# Patient Record
Sex: FEMALE | Race: WHITE | NOT HISPANIC OR LATINO | Employment: FULL TIME | URBAN - METROPOLITAN AREA
[De-identification: names, ages, dates, MRNs, and addresses within clinical notes are randomized per-mention and may not be internally consistent; named-entity substitution may affect disease eponyms.]

---

## 2022-03-15 ENCOUNTER — APPOINTMENT (OUTPATIENT)
Dept: GENERAL RADIOLOGY | Facility: CLINIC | Age: 22
DRG: 603 | End: 2022-03-15
Attending: EMERGENCY MEDICINE
Payer: OTHER MISCELLANEOUS

## 2022-03-15 ENCOUNTER — HOSPITAL ENCOUNTER (INPATIENT)
Facility: CLINIC | Age: 22
LOS: 2 days | Discharge: HOME | DRG: 603 | End: 2022-03-17
Attending: EMERGENCY MEDICINE | Admitting: HOSPITALIST
Payer: OTHER MISCELLANEOUS

## 2022-03-15 DIAGNOSIS — L03.119 CELLULITIS OF HAND: ICD-10-CM
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
## 2022-03-15 LAB
ANION GAP SERPL CALCULATED.3IONS-SCNC: 7 MMOL/L (ref 3–14)
BASOPHILS # BLD AUTO: 0.1 10E3/UL (ref 0–0.2)
BASOPHILS NFR BLD AUTO: 1 %
BUN SERPL-MCNC: 10 MG/DL (ref 7–30)
CALCIUM SERPL-MCNC: 9.5 MG/DL (ref 8.5–10.1)
CHLORIDE BLD-SCNC: 106 MMOL/L (ref 94–109)
CO2 SERPL-SCNC: 26 MMOL/L (ref 20–32)
CREAT SERPL-MCNC: 0.83 MG/DL (ref 0.52–1.04)
EOSINOPHIL # BLD AUTO: 0.2 10E3/UL (ref 0–0.7)
EOSINOPHIL NFR BLD AUTO: 3 %
ERYTHROCYTE [DISTWIDTH] IN BLOOD BY AUTOMATED COUNT: 11.9 % (ref 10–15)
FLUAV RNA SPEC QL NAA+PROBE: NEGATIVE
FLUBV RNA RESP QL NAA+PROBE: NEGATIVE
GFR SERPL CREATININE-BSD FRML MDRD: >90 ML/MIN/1.73M2
GLUCOSE BLD-MCNC: 74 MG/DL (ref 70–99)
HCG SER QL IA.RAPID: NEGATIVE
HCT VFR BLD AUTO: 42.2 % (ref 35–47)
HGB BLD-MCNC: 13.7 G/DL (ref 11.7–15.7)
IMM GRANULOCYTES # BLD: 0 10E3/UL
IMM GRANULOCYTES NFR BLD: 0 %
LYMPHOCYTES # BLD AUTO: 1.8 10E3/UL (ref 0.8–5.3)
LYMPHOCYTES NFR BLD AUTO: 27 %
MCH RBC QN AUTO: 30 PG (ref 26.5–33)
MCHC RBC AUTO-ENTMCNC: 32.5 G/DL (ref 31.5–36.5)
MCV RBC AUTO: 93 FL (ref 78–100)
MONOCYTES # BLD AUTO: 0.5 10E3/UL (ref 0–1.3)
MONOCYTES NFR BLD AUTO: 7 %
NEUTROPHILS # BLD AUTO: 4.2 10E3/UL (ref 1.6–8.3)
NEUTROPHILS NFR BLD AUTO: 62 %
NRBC # BLD AUTO: 0 10E3/UL
NRBC BLD AUTO-RTO: 0 /100
PLATELET # BLD AUTO: 249 10E3/UL (ref 150–450)
POTASSIUM BLD-SCNC: 3.7 MMOL/L (ref 3.4–5.3)
RBC # BLD AUTO: 4.56 10E6/UL (ref 3.8–5.2)
SARS-COV-2 RNA RESP QL NAA+PROBE: NEGATIVE
SODIUM SERPL-SCNC: 139 MMOL/L (ref 133–144)
WBC # BLD AUTO: 6.8 10E3/UL (ref 4–11)

## 2022-03-15 PROCEDURE — 99207 PR CDG-CODE CATEGORY CHANGED: CPT | Performed by: NURSE PRACTITIONER

## 2022-03-15 PROCEDURE — 73130 X-RAY EXAM OF HAND: CPT | Mod: RT

## 2022-03-15 PROCEDURE — 99285 EMERGENCY DEPT VISIT HI MDM: CPT

## 2022-03-15 PROCEDURE — 250N000013 HC RX MED GY IP 250 OP 250 PS 637: Performed by: NURSE PRACTITIONER

## 2022-03-15 PROCEDURE — 80048 BASIC METABOLIC PNL TOTAL CA: CPT | Performed by: EMERGENCY MEDICINE

## 2022-03-15 PROCEDURE — C9803 HOPD COVID-19 SPEC COLLECT: HCPCS

## 2022-03-15 PROCEDURE — 96365 THER/PROPH/DIAG IV INF INIT: CPT

## 2022-03-15 PROCEDURE — 87070 CULTURE OTHR SPECIMN AEROBIC: CPT | Performed by: EMERGENCY MEDICINE

## 2022-03-15 PROCEDURE — 258N000003 HC RX IP 258 OP 636: Performed by: NURSE PRACTITIONER

## 2022-03-15 PROCEDURE — 84702 CHORIONIC GONADOTROPIN TEST: CPT

## 2022-03-15 PROCEDURE — 250N000011 HC RX IP 250 OP 636: Performed by: EMERGENCY MEDICINE

## 2022-03-15 PROCEDURE — 85025 COMPLETE CBC W/AUTO DIFF WBC: CPT | Performed by: EMERGENCY MEDICINE

## 2022-03-15 PROCEDURE — 87636 SARSCOV2 & INF A&B AMP PRB: CPT | Performed by: EMERGENCY MEDICINE

## 2022-03-15 PROCEDURE — 99223 1ST HOSP IP/OBS HIGH 75: CPT | Mod: AI | Performed by: NURSE PRACTITIONER

## 2022-03-15 PROCEDURE — 96367 TX/PROPH/DG ADDL SEQ IV INF: CPT

## 2022-03-15 PROCEDURE — 36415 COLL VENOUS BLD VENIPUNCTURE: CPT | Performed by: EMERGENCY MEDICINE

## 2022-03-15 PROCEDURE — 120N000004 HC R&B MS OVERFLOW

## 2022-03-15 RX ORDER — PROCHLORPERAZINE 25 MG
25 SUPPOSITORY, RECTAL RECTAL EVERY 12 HOURS PRN
Status: DISCONTINUED | OUTPATIENT
Start: 2022-03-15 | End: 2022-03-17 | Stop reason: HOSPADM

## 2022-03-15 RX ORDER — METRONIDAZOLE 500 MG/100ML
500 INJECTION, SOLUTION INTRAVENOUS EVERY 8 HOURS
Status: DISCONTINUED | OUTPATIENT
Start: 2022-03-16 | End: 2022-03-17

## 2022-03-15 RX ORDER — POLYETHYLENE GLYCOL 3350 17 G/17G
17 POWDER, FOR SOLUTION ORAL DAILY PRN
Status: DISCONTINUED | OUTPATIENT
Start: 2022-03-15 | End: 2022-03-17 | Stop reason: HOSPADM

## 2022-03-15 RX ORDER — LEVONORGESTREL 19.5 MG/1
1 INTRAUTERINE DEVICE INTRAUTERINE ONCE
COMMUNITY

## 2022-03-15 RX ORDER — HYDROXYZINE HYDROCHLORIDE 25 MG/1
25 TABLET, FILM COATED ORAL DAILY PRN
COMMUNITY

## 2022-03-15 RX ORDER — CEFTRIAXONE 1 G/1
1 INJECTION, POWDER, FOR SOLUTION INTRAMUSCULAR; INTRAVENOUS EVERY 24 HOURS
Status: DISCONTINUED | OUTPATIENT
Start: 2022-03-16 | End: 2022-03-17

## 2022-03-15 RX ORDER — ACETAMINOPHEN 325 MG/1
650 TABLET ORAL EVERY 6 HOURS PRN
Status: DISCONTINUED | OUTPATIENT
Start: 2022-03-15 | End: 2022-03-17 | Stop reason: HOSPADM

## 2022-03-15 RX ORDER — OXYCODONE HYDROCHLORIDE 5 MG/1
5 TABLET ORAL EVERY 4 HOURS PRN
Status: DISCONTINUED | OUTPATIENT
Start: 2022-03-15 | End: 2022-03-17 | Stop reason: HOSPADM

## 2022-03-15 RX ORDER — BUSPIRONE HYDROCHLORIDE 10 MG/1
10 TABLET ORAL EVERY MORNING
COMMUNITY

## 2022-03-15 RX ORDER — ONDANSETRON 2 MG/ML
4 INJECTION INTRAMUSCULAR; INTRAVENOUS EVERY 6 HOURS PRN
Status: DISCONTINUED | OUTPATIENT
Start: 2022-03-15 | End: 2022-03-17 | Stop reason: HOSPADM

## 2022-03-15 RX ORDER — SODIUM CHLORIDE 9 MG/ML
INJECTION, SOLUTION INTRAVENOUS CONTINUOUS
Status: DISCONTINUED | OUTPATIENT
Start: 2022-03-15 | End: 2022-03-16 | Stop reason: CLARIF

## 2022-03-15 RX ORDER — NALOXONE HYDROCHLORIDE 0.4 MG/ML
0.2 INJECTION, SOLUTION INTRAMUSCULAR; INTRAVENOUS; SUBCUTANEOUS
Status: DISCONTINUED | OUTPATIENT
Start: 2022-03-15 | End: 2022-03-17 | Stop reason: HOSPADM

## 2022-03-15 RX ORDER — ACETAMINOPHEN 650 MG/1
650 SUPPOSITORY RECTAL EVERY 6 HOURS PRN
Status: DISCONTINUED | OUTPATIENT
Start: 2022-03-15 | End: 2022-03-17 | Stop reason: HOSPADM

## 2022-03-15 RX ORDER — AMOXICILLIN 250 MG
2 CAPSULE ORAL 2 TIMES DAILY
Status: DISCONTINUED | OUTPATIENT
Start: 2022-03-15 | End: 2022-03-17 | Stop reason: HOSPADM

## 2022-03-15 RX ORDER — CEFTRIAXONE 1 G/1
1 INJECTION, POWDER, FOR SOLUTION INTRAMUSCULAR; INTRAVENOUS ONCE
Status: COMPLETED | OUTPATIENT
Start: 2022-03-15 | End: 2022-03-15

## 2022-03-15 RX ORDER — METRONIDAZOLE 500 MG/100ML
500 INJECTION, SOLUTION INTRAVENOUS ONCE
Status: COMPLETED | OUTPATIENT
Start: 2022-03-15 | End: 2022-03-15

## 2022-03-15 RX ORDER — ONDANSETRON 4 MG/1
4 TABLET, ORALLY DISINTEGRATING ORAL EVERY 6 HOURS PRN
Status: DISCONTINUED | OUTPATIENT
Start: 2022-03-15 | End: 2022-03-17 | Stop reason: HOSPADM

## 2022-03-15 RX ORDER — HYDROMORPHONE HCL IN WATER/PF 6 MG/30 ML
0.2 PATIENT CONTROLLED ANALGESIA SYRINGE INTRAVENOUS
Status: DISCONTINUED | OUTPATIENT
Start: 2022-03-15 | End: 2022-03-17 | Stop reason: HOSPADM

## 2022-03-15 RX ORDER — NALOXONE HYDROCHLORIDE 0.4 MG/ML
0.4 INJECTION, SOLUTION INTRAMUSCULAR; INTRAVENOUS; SUBCUTANEOUS
Status: DISCONTINUED | OUTPATIENT
Start: 2022-03-15 | End: 2022-03-17 | Stop reason: HOSPADM

## 2022-03-15 RX ORDER — PROCHLORPERAZINE MALEATE 5 MG
10 TABLET ORAL EVERY 6 HOURS PRN
Status: DISCONTINUED | OUTPATIENT
Start: 2022-03-15 | End: 2022-03-17 | Stop reason: HOSPADM

## 2022-03-15 RX ORDER — HYDROXYZINE HYDROCHLORIDE 25 MG/1
25 TABLET, FILM COATED ORAL DAILY PRN
Status: DISCONTINUED | OUTPATIENT
Start: 2022-03-15 | End: 2022-03-17 | Stop reason: HOSPADM

## 2022-03-15 RX ORDER — CITALOPRAM HYDROBROMIDE 40 MG/1
40 TABLET ORAL EVERY MORNING
COMMUNITY

## 2022-03-15 RX ORDER — CITALOPRAM HYDROBROMIDE 20 MG/1
40 TABLET ORAL EVERY MORNING
Status: DISCONTINUED | OUTPATIENT
Start: 2022-03-16 | End: 2022-03-17 | Stop reason: HOSPADM

## 2022-03-15 RX ORDER — BUSPIRONE HYDROCHLORIDE 5 MG/1
10 TABLET ORAL EVERY MORNING
Status: DISCONTINUED | OUTPATIENT
Start: 2022-03-16 | End: 2022-03-17 | Stop reason: HOSPADM

## 2022-03-15 RX ORDER — AMOXICILLIN 250 MG
1 CAPSULE ORAL 2 TIMES DAILY
Status: DISCONTINUED | OUTPATIENT
Start: 2022-03-15 | End: 2022-03-17 | Stop reason: HOSPADM

## 2022-03-15 RX ADMIN — ACETAMINOPHEN 650 MG: 325 TABLET, FILM COATED ORAL at 21:36

## 2022-03-15 RX ADMIN — METRONIDAZOLE 500 MG: 500 INJECTION, SOLUTION INTRAVENOUS at 19:14

## 2022-03-15 RX ADMIN — CEFTRIAXONE 1 G: 1 INJECTION, POWDER, FOR SOLUTION INTRAMUSCULAR; INTRAVENOUS at 18:46

## 2022-03-15 RX ADMIN — SODIUM CHLORIDE: 9 INJECTION, SOLUTION INTRAVENOUS at 21:22

## 2022-03-15 ASSESSMENT — ACTIVITIES OF DAILY LIVING (ADL)
ADLS_ACUITY_SCORE: 4
ADLS_ACUITY_SCORE: 12
ADLS_ACUITY_SCORE: 4
ADLS_ACUITY_SCORE: 12

## 2022-03-15 NOTE — ED TRIAGE NOTES
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  Seen at . Wound was cleaned at that time. Using triple abx ointment, and augmentin. Today hand has been more swollen. The dog that bit her had a \"necrotic mouth\" that owners chose not to do cultures of.   "

## 2022-03-15 NOTE — LETTER
[facility] OBSERVATION DEPT  [address]  [facility] [address]  [phone] March 17, 2022    RE:  Porsche Moody                                                                                                                                                       [address]   [facility] [address]            To whom it may concern:    Porsche Moody was hospitalized from 3/15/2022 to 3/17/2022. She will discharge to home today and will need continued follow-up as an outpatient. Pending any changes by her primary care provider, she is able to return to work without restrictions on Wednesday, March 23, 2022.      Sincerely,          Darien Chan MD

## 2022-03-15 NOTE — ED NOTES
[facility]  ED Nurse Handoff Report    Porsche Moody is a 22 year old female   ED Chief complaint: Dog Bite  . ED Diagnosis:   Final diagnoses:   None     Allergies:   Allergies   Allergen Reactions     Amoxicillin      Sulfa Drugs        Code Status: Full Code  Activity level - Baseline/Home:  Independent. Activity Level - Current:   Independent. Lift room needed: No. Bariatric: No   Needed: No   Isolation: No. Infection: Not Applicable.     Vital Signs:   Vitals:    03/15/22 1737 03/15/22 1739 03/15/22 1800 03/15/22 1830   BP: 131/77  113/71    Pulse:  94 72    Resp:  18     Temp:  98.5  F (36.9  C)     TempSrc:  Temporal     SpO2:  100% 98% 97%       Cardiac Rhythm:  ,      Pain level:    Patient confused: No. Patient Falls Risk: No.   Elimination Status: Has voided   Patient Report - Initial Complaint: Dog bite. Focused Assessment: Dog bite to R hand on Sunday. R hand increasingly edematous, red and painful today. Able to move and feel fingers. CMS/pulses WNL. Puncture wound to top of hand, smaller wound further up wrist.    Tests Performed: Labs, imaging.   Abnormal Results: Labs Ordered and Resulted from Time of ED Arrival to Time of ED Departure - No data to display  XR Hand Right G/E 3 Views    (Results Pending)      Treatments provided: IV antibiotics   Family Comments: Sister present in ED  OBS brochure/video discussed/provided to patient:  N/A  ED Medications:   Medications   cefTRIAXone (ROCEPHIN) 1 g vial to attach to  mL bag for ADULTS or NS 50 mL bag for PEDS (has no administration in time range)   metroNIDAZOLE (FLAGYL) infusion 500 mg (has no administration in time range)     Drips infusing:  No  For the majority of the shift, the patient's behavior Green. Interventions performed were N/A.    Sepsis treatment initiated: No     Patient tested for COVID 19 prior to admission: YES    ED Nurse Name/Phone Number: Rena Vasquez RN,   6:35 PM    RECEIVING UNIT ED  HANDOFF REVIEW    Above ED Nurse Handoff Report was reviewed: Yes  Reviewed by: Lizeth Serrano RN on March 15, 2022 at 8:28 PM

## 2022-03-15 NOTE — ED PROVIDER NOTES
History     Chief Complaint:  Dog Bite       HPI:  Porsche Moody is a 22 year old female who presents with dog bite.  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  2 days ago, she was assisting staff when they brought in a dog that was minimally responsive.  While the patient was shaving the  dog's leg, the dog subsequently bit the patient's right hand.  She was initially seen and evaluated and the wound was cleansed.  She was noted on Augmentin.  She notes the dog is up-to-date on vaccinations including rabies.  She has been continuing with ibuprofen, though presents to the ED today given increased pain, swelling, and erythema.  She was initially seen in urgent care, though referred on to the ED for further evaluation.  Per chart review, last tetanus updated in January of this year.    Allergies:  Amoxicillin  Sulfa Drugs     Medications:    norethindrone-ethinyl estradiol-iron (ESTROSTEP FE) 1-20/1-30/1-35 MG-MCG TABS    Augmentin    Past Medical History:    Otherwise healthy    Past Surgical History:    No past surgical history on file.     Family History:    family history is not on file.    Social History:  Works at vet clinic  UTD on tetanus   Review of Systems:  10 point ROS is negative aside from that mentioned in the HPI      Physical Exam     Patient Vitals for the past 24 hrs:   BP Temp Temp src Pulse Resp SpO2   03/15/22 2000 104/72 -- -- 63 -- 97 %   03/15/22 1930 126/69 -- -- 66 -- 97 %   03/15/22 1900 116/82 -- -- 82 -- 100 %   03/15/22 1830 133/72 -- -- 71 -- 97 %   03/15/22 1800 113/71 -- -- 72 -- 98 %   03/15/22 1739 -- 98.5  F (36.9  C) Temporal 94 18 100 %   03/15/22 1737 131/77 -- -- -- -- --      General:   Well-nourished   Speaking in full sentences  Eyes:   Conjunctiva without injection or scleral icterus  Resp:   Even, non-labored respirationss  CV:    RRR  Skin:   Warm, dry, well perfused   Puncture wounds as noted below   Swelling, warmth and tenderness primarily over the  dorsal aspect of the hand   No significant drainage from wound   No swelling or focal tenderness about wrist  MSK:   Moves all extremities   No focal deformities or swelling   AROM to flexion/extension at the wrist  Neuro:   Alert   Answers questions appropriately   Moves all extremities equally   Gait stable  Psych:   Normal affect, normal mood          Emergency Department Course           Imaging:  Radiology findings were communicated with the patient who voiced understanding of the findings.  XR Hand Right G/E 3 Views   Final Result   IMPRESSION: Soft tissue swelling dorsal to the metacarpal region. No subcutaneous emphysema. No cortical erosion to suggest osteomyelitis. No fractures are evident. Normal alignment.           Laboratory:  Laboratory findings were communicated with the patient who voiced understanding of the findings.  Labs Ordered and Resulted from Time of ED Arrival to Time of ED Departure   BASIC METABOLIC PANEL - Normal       Result Value    Sodium 139      Potassium 3.7      Chloride 106      Carbon Dioxide (CO2) 26      Anion Gap 7      Urea Nitrogen 10      Creatinine 0.83      Calcium 9.5      Glucose 74      GFR Estimate >90     INFLUENZA A/B & SARS-COV2 PCR MULTIPLEX - Normal    Influenza A PCR Negative      Influenza B PCR Negative      SARS CoV2 PCR Negative     ISTAT HCG QUALITATIVE PREGNANCY POCT - Normal    HCG Qualitative POCT Negative     CBC WITH PLATELETS AND DIFFERENTIAL    WBC Count 6.8      RBC Count 4.56      Hemoglobin 13.7      Hematocrit 42.2      MCV 93      MCH 30.0      MCHC 32.5      RDW 11.9      Platelet Count 249      % Neutrophils 62      % Lymphocytes 27      % Monocytes 7      % Eosinophils 3      % Basophils 1      % Immature Granulocytes 0      NRBCs per 100 WBC 0      Absolute Neutrophils 4.2      Absolute Lymphocytes 1.8      Absolute Monocytes 0.5      Absolute Eosinophils 0.2      Absolute Basophils 0.1      Absolute Immature Granulocytes 0.0      Absolute  NRBCs 0.0     AEROBIC BACTERIAL CULTURE ROUTINE      Interventions:  Medications   cefTRIAXone (ROCEPHIN) 1 g vial to attach to  mL bag for ADULTS or NS 50 mL bag for PEDS (0 g Intravenous Stopped 3/15/22 1909)   metroNIDAZOLE (FLAGYL) infusion 500 mg (0 mg Intravenous Stopped 3/15/22 2013)        Emergency Department Course / Reassessment:  Nursing notes and vitals reviewed.  6:00 PM: I performed an exam of the patient as documented above.      The patient was sent for X-ray while in the emergency department, results above.      ED Course as of 03/15/22 2029   Tue Mar 15, 2022   1925 Patient updated   1927 Spoke with Dr. Uribe from Orthopedics   2000 Patient re-checked.  No wrist pain.  Full ROM to flexion/extension at wrist without pain.        I discussed the treatment plan with the patient and sister.  They are in agreement with hospitalization at this time.  Case was discussed with [facility],, who has graciously accepted the patient for admission under care of Dr. Chan.  Questions were answered prior to transfer of care.      Impression & Plan      Medical Decision Making:  Porsche Moody is a 22 year old female who presents to the ER for evaluation of a dog bite.  Vital signs on presentation reveal mildly elevated BP, though are otherwise unremarkable.  Exam as outlined above, concerning for evolving cellulitis of the right hand following puncture wound, despite oral antibiotics.  No significant expression of fluid is noted on exam, though swab obtained to send for culture.  Labs obtained revealing normal WBC count, and negative hCG.  X-ray of the hand reveals no evidence of soft tissue gas.  The erythema, swelling, and tenderness appears over the dorsum of the hand, and distinctly separate from the wrist.  She demonstrates adequate range of motion about the wrist, and denies pain through full ROM about the wrist, for which I feel septic arthritis to be unlikely.  She was provided Rocephin and  metronidazole.  She will require hospitalization given progression of symptoms and failure of Augmentin.  Tetanus status is up-to-date.  She also affirmed the rabies vaccination series is up-to-date on the culprit animal.  Case discussed with Dr. Uribe from [facility] orthopedics  hand surgery, who was in agreement with admission and will consult on the patient.  Patient and sister updated on outlined plan of care.  Questions answered prior to admission.    Diagnosis:    ICD-10-CM    1. Dog bite, initial encounter  W54.0XXA    2. Cellulitis of hand  L03.119         3/15/2022   Klaus Herrera MD Roach, Brian Donald, MD  03/15/22 2030

## 2022-03-16 LAB
ANION GAP SERPL CALCULATED.3IONS-SCNC: 5 MMOL/L (ref 3–14)
BUN SERPL-MCNC: 9 MG/DL (ref 7–30)
CALCIUM SERPL-MCNC: 9.2 MG/DL (ref 8.5–10.1)
CHLORIDE BLD-SCNC: 109 MMOL/L (ref 94–109)
CO2 SERPL-SCNC: 26 MMOL/L (ref 20–32)
CREAT SERPL-MCNC: 0.75 MG/DL (ref 0.52–1.04)
ERYTHROCYTE [DISTWIDTH] IN BLOOD BY AUTOMATED COUNT: 11.9 % (ref 10–15)
GFR SERPL CREATININE-BSD FRML MDRD: >90 ML/MIN/1.73M2
GLUCOSE BLD-MCNC: 90 MG/DL (ref 70–99)
HCT VFR BLD AUTO: 42.1 % (ref 35–47)
HGB BLD-MCNC: 13.3 G/DL (ref 11.7–15.7)
MCH RBC QN AUTO: 29.6 PG (ref 26.5–33)
MCHC RBC AUTO-ENTMCNC: 31.6 G/DL (ref 31.5–36.5)
MCV RBC AUTO: 94 FL (ref 78–100)
PLATELET # BLD AUTO: 221 10E3/UL (ref 150–450)
POTASSIUM BLD-SCNC: 4.1 MMOL/L (ref 3.4–5.3)
RBC # BLD AUTO: 4.49 10E6/UL (ref 3.8–5.2)
SODIUM SERPL-SCNC: 140 MMOL/L (ref 133–144)
WBC # BLD AUTO: 5.9 10E3/UL (ref 4–11)

## 2022-03-16 PROCEDURE — 90471 IMMUNIZATION ADMIN: CPT | Performed by: PHYSICIAN ASSISTANT

## 2022-03-16 PROCEDURE — 250N000011 HC RX IP 250 OP 636: Performed by: PHYSICIAN ASSISTANT

## 2022-03-16 PROCEDURE — 90675 RABIES VACCINE IM: CPT | Performed by: PHYSICIAN ASSISTANT

## 2022-03-16 PROCEDURE — 80048 BASIC METABOLIC PNL TOTAL CA: CPT | Performed by: NURSE PRACTITIONER

## 2022-03-16 PROCEDURE — 120N000004 HC R&B MS OVERFLOW

## 2022-03-16 PROCEDURE — 90375 RABIES IG IM/SC: CPT | Performed by: PHYSICIAN ASSISTANT

## 2022-03-16 PROCEDURE — 99233 SBSQ HOSP IP/OBS HIGH 50: CPT | Performed by: HOSPITALIST

## 2022-03-16 PROCEDURE — 36415 COLL VENOUS BLD VENIPUNCTURE: CPT | Performed by: NURSE PRACTITIONER

## 2022-03-16 PROCEDURE — 250N000013 HC RX MED GY IP 250 OP 250 PS 637: Performed by: NURSE PRACTITIONER

## 2022-03-16 PROCEDURE — 85027 COMPLETE CBC AUTOMATED: CPT | Performed by: NURSE PRACTITIONER

## 2022-03-16 PROCEDURE — 258N000003 HC RX IP 258 OP 636: Performed by: NURSE PRACTITIONER

## 2022-03-16 PROCEDURE — 250N000011 HC RX IP 250 OP 636: Performed by: NURSE PRACTITIONER

## 2022-03-16 RX ADMIN — RABIES VIRUS STRAIN PM-1503-3M ANTIGEN (PROPIOLACTONE INACTIVATED) AND WATER 1 ML: KIT at 12:40

## 2022-03-16 RX ADMIN — CITALOPRAM HYDROBROMIDE 40 MG: 20 TABLET ORAL at 08:45

## 2022-03-16 RX ADMIN — METRONIDAZOLE 500 MG: 500 INJECTION, SOLUTION INTRAVENOUS at 18:48

## 2022-03-16 RX ADMIN — METRONIDAZOLE 500 MG: 500 INJECTION, SOLUTION INTRAVENOUS at 10:16

## 2022-03-16 RX ADMIN — RABIES IMMUNE GLOBULIN (HUMAN) 1530 UNITS: 300 INJECTION, SOLUTION INFILTRATION; INTRAMUSCULAR at 12:45

## 2022-03-16 RX ADMIN — ACETAMINOPHEN 650 MG: 325 TABLET, FILM COATED ORAL at 20:36

## 2022-03-16 RX ADMIN — BUSPIRONE HYDROCHLORIDE 10 MG: 5 TABLET ORAL at 08:45

## 2022-03-16 RX ADMIN — ACETAMINOPHEN 650 MG: 325 TABLET, FILM COATED ORAL at 13:06

## 2022-03-16 RX ADMIN — CEFTRIAXONE 1 G: 1 INJECTION, POWDER, FOR SOLUTION INTRAMUSCULAR; INTRAVENOUS at 18:30

## 2022-03-16 RX ADMIN — SODIUM CHLORIDE: 9 INJECTION, SOLUTION INTRAVENOUS at 08:16

## 2022-03-16 RX ADMIN — METRONIDAZOLE 500 MG: 500 INJECTION, SOLUTION INTRAVENOUS at 02:40

## 2022-03-16 ASSESSMENT — ACTIVITIES OF DAILY LIVING (ADL)
ADLS_ACUITY_SCORE: 4

## 2022-03-16 NOTE — CONSULTS
Care Management     Length of Stay (days): 1    Expected Discharge Date: 03/17/2022     Additional Information:  CM consulted to assist with arranging Rabies shots.   Spoke with bedside nurse who said she had already arranged the rabies shots and there are no additional needs.    Tyra Fragoso RN, BSN, PHN, [facility]  Care Coordinator  [facility]  [phone]

## 2022-03-16 NOTE — PLAN OF CARE
ROOM # 206-2    Living Situation (if not independent, order SW consult): apartment-with boyfriend.  Facility name:  : sister- [phone]    Activity level at baseline: independent  Activity level on admit: independent    Who will be transporting you at discharge - sister    Patient registered to observation; given Patient Bill of Rights; given the opportunity to ask questions about observation status and their plan of care.  Patient has been oriented to the observation room, bathroom and call light is in place.    Discussed discharge goals and expectations with patient/family.

## 2022-03-16 NOTE — PROGRESS NOTES
"Vitals: Blood pressure 121/74, pulse 74, temperature 97.7  F (36.5  C), temperature source Oral, resp. rate 18, height 1.702 m (5' 7\"), weight 76.1 kg (167 lb 11.2 oz), SpO2 99 %.  Labs: drawn labs today have been normal.   Cardiac: WDL   Resp: WDL  Neuro: Patient is alert and oriented x 4  GI: WDL  : WDL  Skin: Patient has cellulitis on right hand/wrist from dog bite. Swelling, redness, and numbness has improved per patient.   Activity: patient is independent in room.  Diet: regular diet  Pain: patient only had complaints of pain after receiving rabies vaccination. Was given tylenol to treat the pain, pain has improved since intervention.   Plan: Patient will receive more flagyl and rocephin IV antibiotics. Plan is to discharge tomorrow pending results from IV antibiotics in treating cellulitis.     "

## 2022-03-16 NOTE — ED NOTES
Reviewed case with [place] [facility].  They did recommend initiation of rabies post-exposure prophylaxis given illness of the dog and inability to test.  Hospitalist team (BARTOLOME Aguilar) contacted by phone and will initiate rabies PEP and inform patient.     Klaus Herrera MD  03/16/22 2957

## 2022-03-16 NOTE — PROGRESS NOTES
[facility]    Medicine Progress Note - Hospitalist Service    Date of Admission:  3/15/2022    Assessment & Plan         Porsche Moody is a 22 year old healthy female who is right-hand dominant was admitted on 3/15/2022 to a medical bed for further evaluation and treatment of cellulitis of her right hand related to sustaining a dog bite on 3/13/2022.    # Acute cellulitis to R hand.  No clinical or radiographic evidence of osteomyelitis.  Does not appear to be a septic arthritis nor necrotizing soft tissue infection.  # Dog bite.  Failed outpatient management  -Orthopedic surgery consulted, recommending medical management  -Continue regular diet with no plans for surgical intervention at this time  -Pain management  -Elevate right upper extremity  -Continue IV Rocephin and Flagyl, hold Augmentin but will likely be able to transition to this at discharge  -CMS checks every 4 hours  -Contacted by ED provider Dr. Herrera who saw patient on 3/15 and after discussion with [facility] it was recommended the patient be treated for rabies postexposure prophylaxis, contacted ED provider Dr. Jarrell who was able to assist with administering rabies immunoglobulin and vaccine  -will need to complete rabies postexposure prophylaxis on day 3, 7, and 14, f/u set up on 3/19 for rabies vaccine at 12 on pediatrics unit (pediatrics will arrange future appointments)     #Anxiety: Continue PTA Celexa, Atarax, and Buspar     Diet: Regular Diet Adult    DVT Prophylaxis: Low Risk/Ambulatory with no VTE prophylaxis indicated  Bailey Catheter: Not present  Central Lines: None  Cardiac Monitoring: None  Code Status: Full Code      Disposition Plan   Expected Discharge: 03/17/2022     Anticipated discharge location:  Awaiting care coordination huddle  Delays:          The patient's care was discussed with the Attending Physician, Dr. Chan, Bedside Nurse, Patient, Patient's Family and ED team.    Crystal Ortiz,  "BRUCE  Hospitalist Service  [facility]  Securely message with the [employer] Web Console (learn more here)  Text page via [employer] Paging/Directory         Clinically Significant Risk Factors Present on Admission                 # Overweight: Estimated body mass index is 26.27 kg/m  as calculated from the following:    Height as of this encounter: 1.702 m (5' 7\").    Weight as of this encounter: 76.1 kg (167 lb 11.2 oz).      ______________________________________________________________________    Interval History   Patient reports purulent discharge prior to admission but none since.  She states the swelling is the same as when she was admitted in the dorsum of her hand but improving in her fingers.  She states erythema is improving.  She is able to make A fist.  Denies any fevers or chills prior to admission.    Discussed plan of care with sister and father at bedside during interview with all questions answered.     Data reviewed today: I reviewed all medications, new labs and imaging results over the last 24 hours.     Physical Exam   Vital Signs: Temp: 97.7  F (36.5  C) Temp src: Oral BP: 121/74 Pulse: 74   Resp: 18 SpO2: 99 % O2 Device: None (Room air)    Weight: 167 lbs 11.2 oz  General Appearance: Pleasant 22-year-old female in no acute distress  HEENT: Normocephalic/atraumatic.  Pupils equal round reactive light accommodation.  EOMI.  Respiratory: Bilateral breath sounds clear to auscultation  Cardiovascular: regular, rate, and rhythm  GI: Soft nontender nondistended normoactive bowel sounds.  Lymph/Hematologic: Right hand swelling.  No significant lymphangitis noted.  Genitourinary: Deferred  Skin:  All extremities are well perfused.  Skin is warm dry.  Puncture wounds on the dorsal surface of the hand.  And there is swelling and warmth and tenderness primarily over the dorsal aspect of the hand, improving per patient. No drainage from wound. No swelling or focal tenderness about the " wrist.  Musculoskeletal: Moves all extremities.  CMS is intact.  Capillary refill less than 3 seconds. Able to only flex fingers about 50% in attempt to make a fist.  Neurologic: Alert and oriented x4.  Follows commands.  Cranial nerves grossly intact.  No focal deficits  Psychiatric: Calm and appropriate.    Data   Results for orders placed or performed during the hospital encounter of 03/15/22   XR Hand Right G/E 3 Views     Status: None    Narrative    EXAM: XR HAND RT G/E 3 VW  LOCATION: [facility]  DATE/TIME: 3/15/2022 6:50 PM    INDICATION: Bite wound. Swelling.  COMPARISON: None.      Impression    IMPRESSION: Soft tissue swelling dorsal to the metacarpal region. No subcutaneous emphysema. No cortical erosion to suggest osteomyelitis. No fractures are evident. Normal alignment.   Basic metabolic panel     Status: Normal   Result Value Ref Range    Sodium 139 133 - 144 mmol/L    Potassium 3.7 3.4 - 5.3 mmol/L    Chloride 106 94 - 109 mmol/L    Carbon Dioxide (CO2) 26 20 - 32 mmol/L    Anion Gap 7 3 - 14 mmol/L    Urea Nitrogen 10 7 - 30 mg/dL    Creatinine 0.83 0.52 - 1.04 mg/dL    Calcium 9.5 8.5 - 10.1 mg/dL    Glucose 74 70 - 99 mg/dL    GFR Estimate >90 >60 mL/min/1.73m2   CBC with platelets and differential     Status: None   Result Value Ref Range    WBC Count 6.8 4.0 - 11.0 10e3/uL    RBC Count 4.56 3.80 - 5.20 10e6/uL    Hemoglobin 13.7 11.7 - 15.7 g/dL    Hematocrit 42.2 35.0 - 47.0 %    MCV 93 78 - 100 fL    MCH 30.0 26.5 - 33.0 pg    MCHC 32.5 31.5 - 36.5 g/dL    RDW 11.9 10.0 - 15.0 %    Platelet Count 249 150 - 450 10e3/uL    % Neutrophils 62 %    % Lymphocytes 27 %    % Monocytes 7 %    % Eosinophils 3 %    % Basophils 1 %    % Immature Granulocytes 0 %    NRBCs per 100 WBC 0 <1 /100    Absolute Neutrophils 4.2 1.6 - 8.3 10e3/uL    Absolute Lymphocytes 1.8 0.8 - 5.3 10e3/uL    Absolute Monocytes 0.5 0.0 - 1.3 10e3/uL    Absolute Eosinophils 0.2 0.0 - 0.7 10e3/uL    Absolute  Basophils 0.1 0.0 - 0.2 10e3/uL    Absolute Immature Granulocytes 0.0 <=0.4 10e3/uL    Absolute NRBCs 0.0 10e3/uL   Asymptomatic Influenza A/B & SARS-CoV2 (COVID-19) Virus PCR Multiplex Nasopharyngeal     Status: Normal    Specimen: Nasopharyngeal; Swab   Result Value Ref Range    Influenza A PCR Negative Negative    Influenza B PCR Negative Negative    SARS CoV2 PCR Negative Negative    Narrative    Testing was performed using the sammi SARS-CoV-2 & Influenza A/B Assay on the sammi Maggi System. This test should be ordered for the detection of SARS-CoV-2 and influenza viruses in individuals who meet clinical and/or epidemiological criteria. Test performance is unknown in asymptomatic patients. This test is for in vitro diagnostic use under the FDA EUA for laboratories certified under CLIA to perform moderate and/or high complexity testing. This test has not been FDA cleared or approved. A negative result does not rule out the presence of PCR inhibitors in the specimen or target RNA in concentration below the limit of detection for the assay. If only one viral target is positive but coinfection with multiple targets is suspected, the sample should be re-tested with another FDA cleared, approved or authorized test, if coinfection would change clinical management. [facility] are certified under the Clinical Laboratory Improvement Amendments of 1988 (CLIA-88) as  qualified to perform moderate and/or high complexity laboratory testing.   iStat HCG Qualitative Pregnancy, POCT     Status: Normal   Result Value Ref Range    HCG Qualitative POCT Negative Negative, Indeterminate   Basic metabolic panel     Status: Normal   Result Value Ref Range    Sodium 140 133 - 144 mmol/L    Potassium 4.1 3.4 - 5.3 mmol/L    Chloride 109 94 - 109 mmol/L    Carbon Dioxide (CO2) 26 20 - 32 mmol/L    Anion Gap 5 3 - 14 mmol/L    Urea Nitrogen 9 7 - 30 mg/dL    Creatinine 0.75 0.52 - 1.04 mg/dL    Calcium 9.2 8.5 - 10.1  mg/dL    Glucose 90 70 - 99 mg/dL    GFR Estimate >90 >60 mL/min/1.73m2   CBC with platelets     Status: Normal   Result Value Ref Range    WBC Count 5.9 4.0 - 11.0 10e3/uL    RBC Count 4.49 3.80 - 5.20 10e6/uL    Hemoglobin 13.3 11.7 - 15.7 g/dL    Hematocrit 42.1 35.0 - 47.0 %    MCV 94 78 - 100 fL    MCH 29.6 26.5 - 33.0 pg    MCHC 31.6 31.5 - 36.5 g/dL    RDW 11.9 10.0 - 15.0 %    Platelet Count 221 150 - 450 10e3/uL   Wound Aerobic Bacterial Culture Routine     Status: None (Preliminary result)    Specimen: Hand, Right; Wound   Result Value Ref Range    Culture No growth, less than 1 day    CBC with platelets differential     Status: None    Narrative    The following orders were created for panel order CBC with platelets differential.  Procedure                               Abnormality         Status                     ---------                               -----------         ------                     CBC with platelets and d...[[id number]]                      Final result                 Please view results for these tests on the individual orders.

## 2022-03-16 NOTE — CONSULTS
Consult Date: 2022    CHIEF COMPLAINT:  Dog bite.    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  She has had increased pain and was seen in the Emergency Room where admission was performed.  Orthopedic evaluation is requested by Dr. Barajas.  She has no other complaints or problems.    For her past history, please see the written medical record.    PHYSICAL EXAMINATION:    GENERAL:  She has normal general appearance and affect.  EXTREMITIES:  Skin perfusion are unremarkable and there is no lymphedema apart from the right hand where there is erythema over the dorsum of the hand.  There is a puncture wound in the region of the CMC joints and the central hand as well as a smaller wound about the proximal wrist.  She is able to move her fingers and wrist without pain.  There is no fluctuance or expressible drainage.  She does have tenderness.    IMPRESSION:  Dog bite, right hand.    RECOMMENDATION:  There is no surgical indication.  I recommend medical management.  She will contact me if additional concerns.    Vladislav Uribe MD        D: 2022   T: 2022   MT: CLAUDY    Name:     CONSTANTINO STUART  MRN:      [id number]        Account:      [id number]   :      2000           Consult Date: 2022     Document: [id number]

## 2022-03-16 NOTE — ED NOTES
I was asked to help administer rabies immunoglobulin and vaccine to this patient who was seen and admitted to the floor.  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 The patient notes that despine the dog's vaccination status (reported up to date on immunizations), the recommendation from MD was for rabies PEP.     HENT:  mmm. Normal phonation.  Eyes: PERRL B/L  Neck: Supple  CV: Peripheral pulses in tact and regular  Resp: Speaking in full sentences without any respiratory distress  Musculoskeletal: Minimal discomfort at the puncture sites.   Skin: 2 scabs (1 in the right wrist and one in the right forearm). Some swelling noted on the right dorsal hand.  Neuro: Alert, no gross motor or sensory deficits  Psych: Calm    A/P:  1. Dog bite to right wrist/forearm. Will give 20U/kg of Rabies Ig (as much as possible around wounds and the rest in the left deltoid) Will also give 1st dose of Rabies vaccine.    Medications   rabies immune globulin 300 units/mL (HYPERAB) injection 1,530 Units (1,530 Units Intramuscular Given 3/16/22 1245) [3.5 mL around the 2 wounds in the right wrist/forearm and 1.6 mL in the left deltoid]   rabies vaccine,human diploid (IMOVAX) vaccine 1 mL (1 mL Intramuscular Given 3/16/22 1240) [right deltoid]     Patient tolerated this well and there were no immediate complications.     Jarod Jarrell,   03/16/22 1310       Jarod Jarrell,   03/16/22 3787

## 2022-03-16 NOTE — PHARMACY-ADMISSION MEDICATION HISTORY
Admission medication history interview status for this patient is complete. See [facility] admission navigator for allergy information, prior to admission medications and immunization status.     Medication history interview done, indicate source(s): Patient  Medication history resources (including written lists, pill bottles, clinic record):SureScripts and Care Everywhere  Pharmacy: [facility]    Changes made to PTA medication list:  Added: all meds  Changed: None  Reported as Not Taking: None  Removed: estrostep FE    Actions taken by pharmacist (provider contacted, etc): Spoke with pt to verify med list.     Additional medication history information: Pt took ibuprofen 600 mg BID starting Sunday evening to this morning 3/15. Per pt she does not usually take ibuprofen.    Medication reconciliation/reorder completed by provider prior to medication history?  N   (Y/N)     For patients on insulin therapy: N    Prior to Admission medications    Medication Sig Last Dose Taking? Auth Provider   amoxicillin-clavulanate (AUGMENTIN) 875-125 MG tablet Take 1 tablet by mouth 2 times daily 3/15/2022 at am Yes Unknown, Entered By History   busPIRone (BUSPAR) 10 MG tablet Take 10 mg by mouth every morning 3/15/2022 at am Yes Unknown, Entered By History   citalopram (CELEXA) 40 MG tablet Take 40 mg by mouth every morning 3/15/2022 at am Yes Unknown, Entered By History   hydrOXYzine (ATARAX) 25 MG tablet Take 25 mg by mouth daily as needed for anxiety More than a month at Unknown time Yes Unknown, Entered By History   levonorgestrel (KYLEENA) 19.5 MG IUD 1 each by Intrauterine route once 11/15/2021 Yes Unknown, Entered By History   VITAMIN D PO Take 1 tablet by mouth daily Past Week at Unknown time Yes Unknown, Entered By History

## 2022-03-16 NOTE — PLAN OF CARE
"[id number]    Inpatient Progress Note:    /84 (BP Location: Right arm)   Pulse 66   Temp 98.1  F (36.7  C) (Oral)   Resp 18   Ht 1.702 m (5' 7\")   Wt 76.1 kg (167 lb 11.2 oz)   SpO2 98%   BMI 26.27 kg/m         Orientation: A&O x4  Neuro: WDL except for mild numbness and tenderness on the back of right hand/ wrist  Pain status: pt rating pain on the hand at 3/10, tylenol given for pain.  Activity: independent  Peripheral edema: WDL except for mild edema on the fore right arm  Resp: WDL  Cardiac: WDL  GI: WDL  : WDL   Skin: WDL except for some redness on the right forearm and 2 small punctures from the dog bite.  LDA: peripheral IV on the left arm  Infusions:cont  0.9 NS, antibiotics  Pertinent Labs: wound cultures pending   Diet: NPO from midnight  Consults:ortho  Discharge Plan:Antibiotics Therapy,  to be seen by ortho in the morning for evaluation.    Will continue to monitor and provide cares.     Rena Chaney RN                            "

## 2022-03-17 VITALS
SYSTOLIC BLOOD PRESSURE: 127 MMHG | BODY MASS INDEX: 26.32 KG/M2 | HEART RATE: 71 BPM | WEIGHT: 167.7 LBS | DIASTOLIC BLOOD PRESSURE: 67 MMHG | HEIGHT: 67 IN | TEMPERATURE: 97.1 F | RESPIRATION RATE: 18 BRPM | OXYGEN SATURATION: 98 %

## 2022-03-17 LAB
BASOPHILS # BLD AUTO: 0.1 10E3/UL (ref 0–0.2)
BASOPHILS NFR BLD AUTO: 1 %
EOSINOPHIL # BLD AUTO: 0.3 10E3/UL (ref 0–0.7)
EOSINOPHIL NFR BLD AUTO: 5 %
ERYTHROCYTE [DISTWIDTH] IN BLOOD BY AUTOMATED COUNT: 11.6 % (ref 10–15)
HCT VFR BLD AUTO: 40.2 % (ref 35–47)
HGB BLD-MCNC: 12.7 G/DL (ref 11.7–15.7)
IMM GRANULOCYTES # BLD: 0 10E3/UL
IMM GRANULOCYTES NFR BLD: 0 %
LYMPHOCYTES # BLD AUTO: 2.2 10E3/UL (ref 0.8–5.3)
LYMPHOCYTES NFR BLD AUTO: 36 %
MCH RBC QN AUTO: 29.3 PG (ref 26.5–33)
MCHC RBC AUTO-ENTMCNC: 31.6 G/DL (ref 31.5–36.5)
MCV RBC AUTO: 93 FL (ref 78–100)
MONOCYTES # BLD AUTO: 0.6 10E3/UL (ref 0–1.3)
MONOCYTES NFR BLD AUTO: 10 %
NEUTROPHILS # BLD AUTO: 2.9 10E3/UL (ref 1.6–8.3)
NEUTROPHILS NFR BLD AUTO: 48 %
NRBC # BLD AUTO: 0 10E3/UL
NRBC BLD AUTO-RTO: 0 /100
PLATELET # BLD AUTO: 222 10E3/UL (ref 150–450)
RBC # BLD AUTO: 4.33 10E6/UL (ref 3.8–5.2)
WBC # BLD AUTO: 6 10E3/UL (ref 4–11)

## 2022-03-17 PROCEDURE — 250N000013 HC RX MED GY IP 250 OP 250 PS 637: Performed by: NURSE PRACTITIONER

## 2022-03-17 PROCEDURE — 36415 COLL VENOUS BLD VENIPUNCTURE: CPT | Performed by: PHYSICIAN ASSISTANT

## 2022-03-17 PROCEDURE — 99239 HOSP IP/OBS DSCHRG MGMT >30: CPT | Performed by: HOSPITALIST

## 2022-03-17 PROCEDURE — 250N000011 HC RX IP 250 OP 636: Performed by: HOSPITALIST

## 2022-03-17 PROCEDURE — 250N000011 HC RX IP 250 OP 636: Performed by: NURSE PRACTITIONER

## 2022-03-17 PROCEDURE — 85025 COMPLETE CBC W/AUTO DIFF WBC: CPT | Performed by: PHYSICIAN ASSISTANT

## 2022-03-17 RX ORDER — CEFTRIAXONE 1 G/1
1 INJECTION, POWDER, FOR SOLUTION INTRAMUSCULAR; INTRAVENOUS ONCE
Status: COMPLETED | OUTPATIENT
Start: 2022-03-17 | End: 2022-03-17

## 2022-03-17 RX ORDER — METRONIDAZOLE 500 MG/100ML
500 INJECTION, SOLUTION INTRAVENOUS ONCE
Status: COMPLETED | OUTPATIENT
Start: 2022-03-17 | End: 2022-03-17

## 2022-03-17 RX ADMIN — METRONIDAZOLE 500 MG: 500 INJECTION, SOLUTION INTRAVENOUS at 02:22

## 2022-03-17 RX ADMIN — BUSPIRONE HYDROCHLORIDE 10 MG: 5 TABLET ORAL at 09:05

## 2022-03-17 RX ADMIN — METRONIDAZOLE 500 MG: 5 INJECTION, SOLUTION INTRAVENOUS at 10:45

## 2022-03-17 RX ADMIN — CEFTRIAXONE 1 G: 1 INJECTION, POWDER, FOR SOLUTION INTRAMUSCULAR; INTRAVENOUS at 11:57

## 2022-03-17 RX ADMIN — CITALOPRAM HYDROBROMIDE 40 MG: 20 TABLET ORAL at 09:05

## 2022-03-17 ASSESSMENT — ACTIVITIES OF DAILY LIVING (ADL)
ADLS_ACUITY_SCORE: 4

## 2022-03-17 NOTE — PROGRESS NOTES
Patient's After Visit Summary was reviewed with patient and/or significant other.   Patient verbalized understanding of After Visit Summary, recommended follow up and was given an opportunity to ask questions.   Discharge medications sent home with patient/family: YES   Discharged with significant other      Rhonda Gottlieb RN

## 2022-03-17 NOTE — DISCHARGE SUMMARY
[facility]  Hospitalist Discharge Summary      Date of Admission:  3/15/2022  Date of Discharge:  3/17/2022  Discharging Provider: Darien Chan MD  Discharge Service: Hospitalist Service    Discharge Diagnoses   Dog bite cellulitis. Need for rabies post-exposure prophylaxis    Follow-ups Needed After Discharge   Follow-up Appointments     Follow-up and recommended labs and tests       Follow up with primary care provider, Anisha Briceno, within 7 days   for hospital follow- up.  No follow up labs or test are needed. Follow-up   with Dr. Vladislav Uribe ([facility]) if you have any further   concerns regarding the dog bite ([phone]). You have a scheduled   rabies shot  this Saturday on the Pediatric Unit. They should be arranging   your subsequent injections.             Unresulted Labs Ordered in the Past 30 Days of this Admission     Date and Time Order Name Status Description    3/15/2022  6:12 PM Wound Aerobic Bacterial Culture Routine Preliminary       These results will be followed up by Hospitalist group    Discharge Disposition   Discharged to home  Condition at discharge: Stable      Hospital Course   Porsche Moody is a 22 year old healthy female who is right-hand dominant who presents for further evaluation after sustaining a dog bite on 3/13/2022.  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  2 days ago she was assisting staff when they brought a dog and was minimally responsive. While she was shaving the dog's leg, the dog subsequently bit the patient's right hand.  She was seen in urgent care and evaluated the wound was cleansed.  She was started on Augmentin.  She states that the dog is up-to-date on vaccinations including rabies vaccination.  Her tetanus is up-to-date (January 2022).  She has been using ibuprofen and Augmentin.  However despite both medication she continues to have increased pain swelling and erythema.  She was able to work today at  the clinic but was referred for further evaluation when staff noted the degree of erythema and edema.    Patient was admitted to the hospital.  She was started on ceftriaxone and Flagyl.  She was seen by Dr. Uribe from orthopedics.  There is no need for any surgical intervention.  Her cellulitis has improved.  Apparently the [facility] contacted the emergency room physician who are taking care of the patient.  He contacted the previous provider to inform her that they were recommending rabies postexposure prophylaxis.  She was given the rabies immunoglobulin and vaccine here.  The subsequent vaccines have been set up to be given as an outpatient.  Prior to admission she completed 2 days of Augmentin.  She received about 2-1/2 days of ceftriaxone and Flagyl here.  She will discharge home and finish the course of Augmentin she was given originally.  She states she has 6 more days left.  Her mother was in the room.  Questions were answered.    Consultations This Hospital Stay   ORTHOPEDIC SURGERY IP CONSULT  CARE MANAGEMENT / SOCIAL WORK IP CONSULT    Code Status   Full Code    Time Spent on this Encounter   I, Darien Chan MD, personally saw the patient today and spent greater than 30 minutes discharging this patient.       Darien Chan MD  [facility] OBSERVATION DEPT  [address] [place] [address]  Phone: [phone]  ______________________________________________________________________    Physical Exam   Vital Signs: Temp: 97.1  F (36.2  C) Temp src: Oral BP: 127/67 Pulse: 71   Resp: 18 SpO2: 98 % O2 Device: None (Room air)    Weight: 167 lbs 11.2 oz  Constitutional: awake, alert, cooperative, no apparent distress, and appears stated age  Eyes: Lids and lashes normal, pupils equal, round and reactive to light, extra ocular muscles intact, sclera clear, conjunctiva normal  ENT: Normocephalic, without obvious abnormality, atraumatic, sinuses nontender on  palpation, external ears without lesions, oral pharynx with moist mucous membranes, tonsils without erythema or exudates, gums normal and good dentition.  Respiratory: No increased work of breathing, good air exchange, clear to auscultation bilaterally, no crackles or wheezing  Cardiovascular: Normal apical impulse, regular rate and rhythm, normal S1 and S2, no S3 or S4, and no murmur noted  GI: No scars, normal bowel sounds, soft, non-distended, non-tender, no masses palpated, no hepatosplenomegally  Skin: right arm dog bite: scabbed. No erythema. Mild swelling       Primary Care Physician   Anisha Briceno    Discharge Orders      Reason for your hospital stay    Dog bite cellulitis     Activity    Your activity upon discharge: activity as tolerated     Follow-up and recommended labs and tests     Follow up with primary care provider, Anisha Briceno, within 7 days for hospital follow- up.  No follow up labs or test are needed. Follow-up with Dr. Vladislav Uribe ([facility]) if you have any further concerns regarding the dog bite ([phone]). You have a scheduled rabies shot  this Saturday on the Pediatric Unit. They should be arranging your subsequent injections.     Diet    Follow this diet upon discharge: Orders Placed This Encounter      Regular Diet Adult       Significant Results and Procedures   Most Recent 3 CBC's:Recent Labs   Lab Test 03/17/22  0539 03/16/22  1045 03/15/22  1822   WBC 6.0 5.9 6.8   HGB 12.7 13.3 13.7   MCV 93 94 93    221 249     Most Recent 3 BMP's:Recent Labs   Lab Test 03/16/22  1045 03/15/22  1822    139   POTASSIUM 4.1 3.7   CHLORIDE 109 106   CO2 26 26   BUN 9 10   CR 0.75 0.83   ANIONGAP 5 7   JAME 9.2 9.5   GLC 90 74     Most Recent 2 LFT's:No lab results found.,   Results for orders placed or performed during the hospital encounter of 03/15/22   XR Hand Right G/E 3 Views    Narrative    EXAM: XR HAND RT G/E 3 VW  LOCATION: [facility]  HOSPITAL  DATE/TIME: 3/15/2022 6:50 PM    INDICATION: Bite wound. Swelling.  COMPARISON: None.      Impression    IMPRESSION: Soft tissue swelling dorsal to the metacarpal region. No subcutaneous emphysema. No cortical erosion to suggest osteomyelitis. No fractures are evident. Normal alignment.       Discharge Medications   Current Discharge Medication List      CONTINUE these medications which have NOT CHANGED    Details   amoxicillin-clavulanate (AUGMENTIN) 875-125 MG tablet Take 1 tablet by mouth 2 times daily      busPIRone (BUSPAR) 10 MG tablet Take 10 mg by mouth every morning      citalopram (CELEXA) 40 MG tablet Take 40 mg by mouth every morning      hydrOXYzine (ATARAX) 25 MG tablet Take 25 mg by mouth daily as needed for anxiety      levonorgestrel (KYLEENA) 19.5 MG IUD 1 each by Intrauterine route once      VITAMIN D PO Take 1 tablet by mouth daily           Allergies   Allergies   Allergen Reactions     Amoxicillin      Sulfa Drugs

## 2022-03-17 NOTE — PLAN OF CARE
[id number]    Inpatient Progress Note:        Orientation: A&O x4  Neuro: WDL except for mild numbness and tenderness on the back of right hand/ wrist  Pain status: pt is denying pain.  Activity: independent  Peripheral edema: WDL except for mild edema on the fore right arm  Resp: WDL  Cardiac: WDL  GI: WDL  : WDL   Skin: WDL except for some redness on the back of the right hand.   LDA: peripheral IV on the left arm  Infusions; saline locked  Pertinent Labs; N/A  Diet; regular diet  Discharge Plan: plan is to discharge today. switch to oral antibiotics.   Will continue to monitor and provide cares.     Rena Chaney RN                             English

## 2022-03-18 LAB — BACTERIA WND CULT: NO GROWTH

## 2022-03-19 ENCOUNTER — HOSPITAL ENCOUNTER (OUTPATIENT)
Dept: OUTPATIENT PROCEDURES | Facility: CLINIC | Age: 22
Discharge: HOME | End: 2022-03-19
Attending: HOSPITALIST | Admitting: HOSPITALIST
Payer: OTHER MISCELLANEOUS

## 2022-03-19 VITALS
OXYGEN SATURATION: 97 % | HEART RATE: 80 BPM | SYSTOLIC BLOOD PRESSURE: 120 MMHG | TEMPERATURE: 98.8 F | RESPIRATION RATE: 20 BRPM | DIASTOLIC BLOOD PRESSURE: 77 MMHG

## 2022-03-19 PROCEDURE — 90471 IMMUNIZATION ADMIN: CPT | Performed by: HOSPITALIST

## 2022-03-19 PROCEDURE — 250N000011 HC RX IP 250 OP 636: Performed by: HOSPITALIST

## 2022-03-19 PROCEDURE — 90675 RABIES VACCINE IM: CPT | Performed by: HOSPITALIST

## 2022-03-19 RX ADMIN — Medication 1 ML: at 12:38

## 2022-03-20 ENCOUNTER — HEALTH MAINTENANCE LETTER (OUTPATIENT)
Age: 22
End: 2022-03-20

## 2022-03-23 ENCOUNTER — HOSPITAL ENCOUNTER (OUTPATIENT)
Dept: OUTPATIENT PROCEDURES | Facility: CLINIC | Age: 22
Discharge: HOME | End: 2022-03-23
Attending: HOSPITALIST | Admitting: HOSPITALIST
Payer: OTHER MISCELLANEOUS

## 2022-03-23 VITALS
SYSTOLIC BLOOD PRESSURE: 115 MMHG | TEMPERATURE: 98.1 F | DIASTOLIC BLOOD PRESSURE: 72 MMHG | HEART RATE: 75 BPM | OXYGEN SATURATION: 99 % | RESPIRATION RATE: 18 BRPM

## 2022-03-23 PROCEDURE — 250N000011 HC RX IP 250 OP 636: Performed by: HOSPITALIST

## 2022-03-23 PROCEDURE — 90471 IMMUNIZATION ADMIN: CPT | Performed by: HOSPITALIST

## 2022-03-23 PROCEDURE — 90675 RABIES VACCINE IM: CPT | Performed by: HOSPITALIST

## 2022-03-23 RX ADMIN — Medication 1 ML: at 12:23

## 2022-03-23 NOTE — PLAN OF CARE
VSS. Afebrile. Rabies vaccine given in right deltoid. discharge home. Aware of next appointment.

## 2022-03-30 ENCOUNTER — HOSPITAL ENCOUNTER (OUTPATIENT)
Dept: OUTPATIENT PROCEDURES | Facility: CLINIC | Age: 22
Discharge: HOME | End: 2022-03-30
Payer: OTHER MISCELLANEOUS

## 2022-03-30 PROCEDURE — 250N000011 HC RX IP 250 OP 636

## 2022-03-30 PROCEDURE — 90471 IMMUNIZATION ADMIN: CPT

## 2022-03-30 PROCEDURE — 90675 RABIES VACCINE IM: CPT

## 2022-03-30 RX ADMIN — Medication 1 ML: at 12:24

## 2022-03-30 NOTE — PLAN OF CARE
VSS. Afebrile. Rabiews vaccine given in right deltoid. discharge home . No further appointments.

## 2022-09-10 ENCOUNTER — HEALTH MAINTENANCE LETTER (OUTPATIENT)
Age: 22
End: 2022-09-10

## 2022-12-20 NOTE — ED NOTES
Per patient, dog was up to date on vaccinations including rabies. Patient up to date on tetanus vaccine.    Localized Dermabrasion With Wire Brush Text: The patient was draped in routine manner.  Localized dermabrasion using 3 x 17 mm wire brush was performed in routine manner to papillary dermis. This spot dermabrasion is being performed to complete skin cancer reconstruction. It also will eliminate the other sun damaged precancerous cells that are known to be part of the regional effect of a lifetime's worth of sun exposure. This localized dermabrasion is therapeutic and should not be considered cosmetic in any regard. Localized Dermabrasion Text: The patient was draped in routine manner.  Localized dermabrasion using 3 x 17 mm wire brush was performed in routine manner to papillary dermis. This spot dermabrasion is being performed to complete skin cancer reconstruction. It also will eliminate the other sun damaged precancerous cells that are known to be part of the regional effect of a lifetime's worth of sun exposure. This localized dermabrasion is therapeutic and should not be considered cosmetic in any regard.

## 2023-01-21 ENCOUNTER — HEALTH MAINTENANCE LETTER (OUTPATIENT)
Age: 23
End: 2023-01-21

## 2024-02-18 ENCOUNTER — HEALTH MAINTENANCE LETTER (OUTPATIENT)
Age: 24
End: 2024-02-18

## 2025-03-09 ENCOUNTER — HEALTH MAINTENANCE LETTER (OUTPATIENT)
Age: 25
End: 2025-03-09